# Patient Record
Sex: FEMALE | Race: OTHER | NOT HISPANIC OR LATINO | URBAN - METROPOLITAN AREA
[De-identification: names, ages, dates, MRNs, and addresses within clinical notes are randomized per-mention and may not be internally consistent; named-entity substitution may affect disease eponyms.]

---

## 2022-05-24 PROBLEM — Z00.00 ENCOUNTER FOR PREVENTIVE HEALTH EXAMINATION: Status: ACTIVE | Noted: 2022-05-24

## 2022-06-01 ENCOUNTER — OUTPATIENT (OUTPATIENT)
Dept: OUTPATIENT SERVICES | Facility: HOSPITAL | Age: 62
LOS: 1 days | End: 2022-06-01

## 2022-06-01 ENCOUNTER — APPOINTMENT (OUTPATIENT)
Dept: CT IMAGING | Facility: CLINIC | Age: 62
End: 2022-06-01
Payer: COMMERCIAL

## 2022-06-01 ENCOUNTER — RESULT REVIEW (OUTPATIENT)
Age: 62
End: 2022-06-01

## 2022-06-01 ENCOUNTER — APPOINTMENT (OUTPATIENT)
Dept: VASCULAR SURGERY | Facility: CLINIC | Age: 62
End: 2022-06-01
Payer: COMMERCIAL

## 2022-06-01 VITALS
SYSTOLIC BLOOD PRESSURE: 125 MMHG | BODY MASS INDEX: 28.17 KG/M2 | DIASTOLIC BLOOD PRESSURE: 84 MMHG | WEIGHT: 165 LBS | HEART RATE: 96 BPM | HEIGHT: 64 IN

## 2022-06-01 DIAGNOSIS — R10.9 UNSPECIFIED ABDOMINAL PAIN: ICD-10-CM

## 2022-06-01 DIAGNOSIS — Z01.818 ENCOUNTER FOR OTHER PREPROCEDURAL EXAMINATION: ICD-10-CM

## 2022-06-01 PROCEDURE — 99204 OFFICE O/P NEW MOD 45 MIN: CPT

## 2022-06-01 PROCEDURE — 74175 CTA ABDOMEN W/CONTRAST: CPT | Mod: 26

## 2022-06-01 PROCEDURE — 93979 VASCULAR STUDY: CPT

## 2022-06-02 PROBLEM — Z01.818 PREOP TESTING: Status: ACTIVE | Noted: 2022-06-02

## 2022-06-02 RX ORDER — CHOLECALCIFEROL (VITAMIN D3) 1250 MCG
1.25 MG CAPSULE ORAL
Qty: 4 | Refills: 0 | Status: ACTIVE | COMMUNITY
Start: 2021-12-30

## 2022-06-02 RX ORDER — CICLOPIROX 80 MG/ML
8 SOLUTION TOPICAL
Qty: 7 | Refills: 0 | Status: ACTIVE | COMMUNITY
Start: 2022-03-22

## 2022-06-02 RX ORDER — BENZONATATE 100 MG/1
100 CAPSULE ORAL
Qty: 20 | Refills: 0 | Status: ACTIVE | COMMUNITY
Start: 2022-01-06

## 2022-06-02 RX ORDER — PANTOPRAZOLE 40 MG/1
40 TABLET, DELAYED RELEASE ORAL
Qty: 30 | Refills: 0 | Status: ACTIVE | COMMUNITY
Start: 2022-05-09

## 2022-06-02 RX ORDER — ALBUTEROL SULFATE 90 UG/1
108 (90 BASE) INHALANT RESPIRATORY (INHALATION)
Qty: 8 | Refills: 0 | Status: ACTIVE | COMMUNITY
Start: 2022-01-06

## 2022-06-02 RX ORDER — PROMETHAZINE HYDROCHLORIDE AND DEXTROMETHORPHAN HYDROBROMIDE ORAL SOLUTION 15; 6.25 MG/5ML; MG/5ML
6.25-15 SOLUTION ORAL
Qty: 200 | Refills: 0 | Status: ACTIVE | COMMUNITY
Start: 2021-12-30

## 2022-06-03 NOTE — ASSESSMENT
[Arterial/Venous Disease] : arterial/venous disease [Aneurysm Surgery] : aneurysm surgery [FreeTextEntry1] : 60 y/o F w/ 1.9cm splenic artery aneurysm. On exam, radial pulses intact bilaterally. Abdominal exam benign. We reviewed outside records and completed Abd US which showed patent abdominal aorta. Aneurysmal dilation noted in the splenic artery at the hilum. Diameter measures 1.9 x 1.7 cm. Good perfusion in the spleen. Discussed with pt the abdominal symptoms are likely from the splenic artery aneurysm. It is also close to 2cm which is when we start considering surgical treatment. We will order a CT angiogram for further evaluation for possible repair in the near future. We also discussed endovascular embolization of the aneurysm. Risk, complications and alternatives were discussed, all questions were answered. We will discuss with the pt results of the scan and next steps to follow.

## 2022-06-03 NOTE — ASU PATIENT PROFILE, ADULT - NSICDXPASTMEDICALHX_GEN_ALL_CORE_FT
PAST MEDICAL HISTORY:  2019 novel coronavirus disease (COVID-19) Dec 2021    GERD (gastroesophageal reflux disease)

## 2022-06-03 NOTE — PROCEDURE
[FreeTextEntry1] : Abd US ordered to eval splenic artery aneurysm, shows: Patent abdominal aorta. Aneurysmal dilation noted in the splenic artery at the hilum. Diameter measures 1.9 x 1.7 cm. Good perfusion in the spleen.

## 2022-06-03 NOTE — ADDENDUM
[FreeTextEntry1] : I, Dr. Chico Gil, personally performed the evaluation and management (E/M) services for this new patient.  That E/M includes conducting the initial examination, assessing all conditions, and establishing the plan of care.  Today, my ACP, Lisa Razo NP, was here to observe my evaluation and management services for this patient to be followed going forward. \par \par The documentation for this encounter was entered by Marie Rosario acting as a scribe for Dr.Gary Gil.\par

## 2022-06-03 NOTE — ASU PATIENT PROFILE, ADULT - FALL HARM RISK - UNIVERSAL INTERVENTIONS
Bed in lowest position, wheels locked, appropriate side rails in place/Call bell, personal items and telephone in reach/Instruct patient to call for assistance before getting out of bed or chair/Non-slip footwear when patient is out of bed/[place] to call system/Physically safe environment - no spills, clutter or unnecessary equipment/Purposeful Proactive Rounding/Room/bathroom lighting operational, light cord in reach

## 2022-06-03 NOTE — CONSULT LETTER
[Dear  ___] : Dear  [unfilled], [FreeTextEntry2] : Mao Franklin MD\par [address]\[place] [address]\par \par Chrissy Carter MD\par [address]\[place] [address]\par \par Yahir Carmona MD\par [facility]\par [address]\[place] [address] [FreeTextEntry1] : I saw Ms Lizeth Mensah for evaluation of splenic artery aneurysm. She reports epigastric discomfort that has been present for a couple of months. The discomfort radiates across both upper quadrants and towards the back. She had an ultrasound which showed a splenic artery aneurysm in addition to cholelithiasis.  She denies post prandial pain, nausea or vomiting. No family history of aneurysms.\par \par On exam, abdomen is soft and nontender. No palpable masses. Blood pressure 125/84, heart rate 96 b/min.\par \par Abdominal ultrasound in the office is consistent with previous study which shows a 1.9 cm splenic artery aneurysm. \par \par Ms Mensah has a splenic aneurysm approaching 2cm.  Her symptomatology is consistent with a retroperitoneal process.  The symptoms are not consistent with cholelithiasis.  I am concerned that the symptoms may be related to the splenic artery aneurysm.  I obtained a CT angiogram today.  It confirms a 2 cm splenic artery aneurysm.  \par \par We are making plans to proceed with embolization of the splenic artery aneurysm.  Since she is possibly symptomatic were proceeding within the next several days.\par \par I will keep you posted regarding her status.    [FreeTextEntry3] : Sincerely, \par \par Chico Gil M.D. \par , Surgical Services [facility]\par , Department of Surgery [facility]\par Professor of Surgery, [employer] at [facility]

## 2022-06-03 NOTE — DATA REVIEWED
[FreeTextEntry1] : See outside medical records\par Abd US 5/19/22: splenic art aneurysm measuring [id number] near the splenic hilum

## 2022-06-03 NOTE — HISTORY OF PRESENT ILLNESS
[FreeTextEntry1] : 60 y/o F referred by Dr Franklin. She has a PMHx of GERD, IBS, hiatal hernia, gallstones, diverticulosis, and newly diagnosed splenic artery aneurysm. She reports intermittent epigastric abdominal pain over the last few months. The pain, discomfort and pressure-like feeling radiates to bilateral upper quadrants and towards the back. She also reports some abdominal bloating/distention. She had workup imaging completed and the splenic aneurysm was found. Received records prior to her visit which are available for us to review. These were all completed by her GI doctor and she adds they want to make sure the symptoms are not from the aneurysm vs gallstones. Denies any nausea, vomiting, diarrhea, constipation. She notes the discomfort also occurs mainly while she is sitting and is relieved when she is able to leans back. No family hx of aneurysms. She was on Omeprazole but stopped it a few days ago. \par \par \par SHx:\par -Works as an , WFH \par -Never smoker\par \par FHx:\par -Mother: passed age 90; hx of heart and kidney failure\par -Father: passed age 93; hx of colon ca/surgery and PNA

## 2022-06-03 NOTE — PHYSICAL EXAM
[Respiratory Effort] : normal respiratory effort [Normal Rate and Rhythm] : normal rate and rhythm [No Rash or Lesion] : No rash or lesion [Alert] : alert [Oriented to Person] : oriented to person [Oriented to Place] : oriented to place [Oriented to Time] : oriented to time [Calm] : calm [2+] : left 2+ [Carotid Bruits] : no carotid bruits [Right Carotid Bruit] : no bruit heard over the right carotid [Left Carotid Bruit] : no bruit heard over the left carotid [Ankle Swelling (On Exam)] : not present [Abdomen Masses] : No abdominal masses [Abdomen Tenderness] : ~T ~M No abdominal tenderness [de-identified] : WN/WD [de-identified] : NCAT [FreeTextEntry1] : Radial pulses intact bilaterally. [de-identified] : soft, nondistended, no tenderness.   [de-identified] : FROM

## 2022-06-05 ENCOUNTER — TRANSCRIPTION ENCOUNTER (OUTPATIENT)
Age: 62
End: 2022-06-05

## 2022-06-06 ENCOUNTER — APPOINTMENT (OUTPATIENT)
Dept: VASCULAR SURGERY | Facility: HOSPITAL | Age: 62
End: 2022-06-06

## 2022-06-06 ENCOUNTER — TRANSCRIPTION ENCOUNTER (OUTPATIENT)
Age: 62
End: 2022-06-06

## 2022-06-06 ENCOUNTER — INPATIENT (INPATIENT)
Facility: HOSPITAL | Age: 62
LOS: 0 days | Discharge: HOME | DRG: 272 | End: 2022-06-07
Attending: SURGERY | Admitting: SURGERY
Payer: COMMERCIAL

## 2022-06-06 VITALS
SYSTOLIC BLOOD PRESSURE: 130 MMHG | DIASTOLIC BLOOD PRESSURE: 70 MMHG | OXYGEN SATURATION: 100 % | HEART RATE: 79 BPM | RESPIRATION RATE: 16 BRPM | HEIGHT: 64 IN | WEIGHT: 169.32 LBS | TEMPERATURE: 98 F

## 2022-06-06 LAB — LACTATE SERPL-SCNC: 1.5 MMOL/L — SIGNIFICANT CHANGE UP (ref 0.5–2)

## 2022-06-06 PROCEDURE — 37242 VASC EMBOLIZE/OCCLUDE ARTERY: CPT | Mod: GC

## 2022-06-06 PROCEDURE — 83605 ASSAY OF LACTIC ACID: CPT

## 2022-06-06 PROCEDURE — C1894: CPT

## 2022-06-06 PROCEDURE — 36625 INSERTION CATHETER ARTERY: CPT | Mod: 59

## 2022-06-06 PROCEDURE — 76000 FLUOROSCOPY <1 HR PHYS/QHP: CPT

## 2022-06-06 PROCEDURE — C1769: CPT

## 2022-06-06 PROCEDURE — C1889: CPT

## 2022-06-06 PROCEDURE — C1887: CPT

## 2022-06-06 PROCEDURE — 36415 COLL VENOUS BLD VENIPUNCTURE: CPT

## 2022-06-06 DEVICE — SHEATH INTRODUCER TERUMO PINNACLE CORONARY 6FR X 10CM X 0.038" MINI WIRE: Type: IMPLANTABLE DEVICE | Site: LEFT | Status: FUNCTIONAL

## 2022-06-06 DEVICE — IMPLANTABLE DEVICE: Type: IMPLANTABLE DEVICE | Site: LEFT | Status: FUNCTIONAL

## 2022-06-06 DEVICE — CATH ANG BERENSTN 5FRX65CM: Type: IMPLANTABLE DEVICE | Site: LEFT | Status: FUNCTIONAL

## 2022-06-06 DEVICE — COIL POD J SOFT 60MM: Type: IMPLANTABLE DEVICE | Site: LEFT | Status: FUNCTIONAL

## 2022-06-06 DEVICE — GUIDEWIRE GLIDEWIRE ANGLED TIP 0.035" X 180CM STIFF: Type: IMPLANTABLE DEVICE | Site: LEFT | Status: FUNCTIONAL

## 2022-06-06 DEVICE — GWIRE SUPRACORE .035X370: Type: IMPLANTABLE DEVICE | Site: LEFT | Status: FUNCTIONAL

## 2022-06-06 DEVICE — SURGICEL FIBRILLAR 4 X 4": Type: IMPLANTABLE DEVICE | Site: LEFT | Status: FUNCTIONAL

## 2022-06-06 DEVICE — GUIDEWIRE RADIFOCUS GLIDEWIRE STANDARD ANGLED TIP 0.035" X 260CM: Type: IMPLANTABLE DEVICE | Site: LEFT | Status: FUNCTIONAL

## 2022-06-06 DEVICE — SHEATH INTRODUCER TERUMO PINNACLE CORONARY 5FR X 10CM X 0.038" MINI WIRE: Type: IMPLANTABLE DEVICE | Site: LEFT | Status: FUNCTIONAL

## 2022-06-06 DEVICE — CATH SIZING MARK PIGTL 5FR 100CM: Type: IMPLANTABLE DEVICE | Site: LEFT | Status: FUNCTIONAL

## 2022-06-06 DEVICE — CATH SOFTVU BERENSTN 5FRX100: Type: IMPLANTABLE DEVICE | Site: LEFT | Status: FUNCTIONAL

## 2022-06-06 DEVICE — GWIRE BENT 0.035INX180CM TFE: Type: IMPLANTABLE DEVICE | Site: LEFT | Status: FUNCTIONAL

## 2022-06-06 DEVICE — GWIRE ANGL .035X180 STD: Type: IMPLANTABLE DEVICE | Site: LEFT | Status: FUNCTIONAL

## 2022-06-06 RX ORDER — ONDANSETRON 8 MG/1
4 TABLET, FILM COATED ORAL ONCE
Refills: 0 | Status: COMPLETED | OUTPATIENT
Start: 2022-06-06 | End: 2022-06-06

## 2022-06-06 RX ORDER — SODIUM CHLORIDE 9 MG/ML
1000 INJECTION INTRAMUSCULAR; INTRAVENOUS; SUBCUTANEOUS
Refills: 0 | Status: DISCONTINUED | OUTPATIENT
Start: 2022-06-06 | End: 2022-06-07

## 2022-06-06 RX ORDER — DEXAMETHASONE 0.5 MG/5ML
4 ELIXIR ORAL ONCE
Refills: 0 | Status: COMPLETED | OUTPATIENT
Start: 2022-06-06 | End: 2022-06-06

## 2022-06-06 RX ORDER — METOCLOPRAMIDE HCL 10 MG
10 TABLET ORAL ONCE
Refills: 0 | Status: DISCONTINUED | OUTPATIENT
Start: 2022-06-06 | End: 2022-06-06

## 2022-06-06 RX ORDER — SODIUM CHLORIDE 9 MG/ML
1000 INJECTION INTRAMUSCULAR; INTRAVENOUS; SUBCUTANEOUS
Refills: 0 | Status: DISCONTINUED | OUTPATIENT
Start: 2022-06-06 | End: 2022-06-06

## 2022-06-06 RX ORDER — METOCLOPRAMIDE HCL 10 MG
10 TABLET ORAL ONCE
Refills: 0 | Status: COMPLETED | OUTPATIENT
Start: 2022-06-06 | End: 2022-06-06

## 2022-06-06 RX ORDER — FAMOTIDINE 10 MG/ML
20 INJECTION INTRAVENOUS ONCE
Refills: 0 | Status: COMPLETED | OUTPATIENT
Start: 2022-06-06 | End: 2022-06-06

## 2022-06-06 RX ADMIN — ONDANSETRON 4 MILLIGRAM(S): 8 TABLET, FILM COATED ORAL at 16:17

## 2022-06-06 RX ADMIN — Medication 10 MILLIGRAM(S): at 18:16

## 2022-06-06 RX ADMIN — SODIUM CHLORIDE 100 MILLILITER(S): 9 INJECTION INTRAMUSCULAR; INTRAVENOUS; SUBCUTANEOUS at 22:55

## 2022-06-06 RX ADMIN — Medication 10 MILLIGRAM(S): at 20:08

## 2022-06-06 RX ADMIN — SODIUM CHLORIDE 70 MILLILITER(S): 9 INJECTION INTRAMUSCULAR; INTRAVENOUS; SUBCUTANEOUS at 15:52

## 2022-06-06 RX ADMIN — Medication 4 MILLIGRAM(S): at 16:50

## 2022-06-06 RX ADMIN — FAMOTIDINE 20 MILLIGRAM(S): 10 INJECTION INTRAVENOUS at 16:17

## 2022-06-06 NOTE — ASU DISCHARGE PLAN (ADULT/PEDIATRIC) - CARE PROVIDER_API CALL
Chico Gil)  Vascular Surgery  [address]  [place], [place] [address]  Phone: [phone]  Fax: [phone]  Follow Up Time: 2 weeks

## 2022-06-06 NOTE — BRIEF OPERATIVE NOTE - OPERATION/FINDINGS
Procedure: Splenic Artery Aneurysm Embolization  Indication: Splenic artery aneurysm  Description: Left brachial artery cutdown, splenic artery embolization with [id number] outflow; 2 x [id number] framing coil, 5x 60cm packing coils, and 5x30mm and 4x15mm coil in the inflow.  IVF: 1L  EBL: 50cc  Pulses: palpable radial

## 2022-06-07 VITALS
HEART RATE: 94 BPM | DIASTOLIC BLOOD PRESSURE: 67 MMHG | OXYGEN SATURATION: 98 % | SYSTOLIC BLOOD PRESSURE: 109 MMHG | RESPIRATION RATE: 20 BRPM

## 2022-06-07 NOTE — PACU DISCHARGE NOTE - COMMENTS
Patient is hemodynamically stable and reports no c/o pain. Meets PACU discharge criteria. Discharge instructions given to patient who stated full understanding and denied having any questions. Patient discharged home in care of her . (4) excellent

## 2022-06-10 ENCOUNTER — APPOINTMENT (OUTPATIENT)
Dept: VASCULAR SURGERY | Facility: CLINIC | Age: 62
End: 2022-06-10

## 2022-06-10 VITALS
WEIGHT: 165 LBS | HEART RATE: 93 BPM | DIASTOLIC BLOOD PRESSURE: 79 MMHG | SYSTOLIC BLOOD PRESSURE: 128 MMHG | BODY MASS INDEX: 28.17 KG/M2 | HEIGHT: 64 IN

## 2022-06-10 DIAGNOSIS — K21.9 GASTRO-ESOPHAGEAL REFLUX DISEASE WITHOUT ESOPHAGITIS: ICD-10-CM

## 2022-06-10 DIAGNOSIS — I72.8 ANEURYSM OF OTHER SPECIFIED ARTERIES: ICD-10-CM

## 2022-06-10 DIAGNOSIS — K58.9 IRRITABLE BOWEL SYNDROME WITHOUT DIARRHEA: ICD-10-CM

## 2022-06-10 DIAGNOSIS — K44.9 DIAPHRAGMATIC HERNIA WITHOUT OBSTRUCTION OR GANGRENE: ICD-10-CM

## 2022-06-10 DIAGNOSIS — K80.20 CALCULUS OF GALLBLADDER WITHOUT CHOLECYSTITIS WITHOUT OBSTRUCTION: ICD-10-CM

## 2022-06-10 PROCEDURE — 93979 VASCULAR STUDY: CPT

## 2022-06-10 PROCEDURE — 99213 OFFICE O/P EST LOW 20 MIN: CPT

## 2022-06-10 NOTE — DISCUSSION/SUMMARY
[FreeTextEntry1] : 60 y/o F w/ >2cm splenic artery aneurysm now s/p embolization on 6/6/22. On exam, radial pulses intact bilaterally. Left arm access site with very mild swelling and ecchymosis. No signs of infection. Abd US today shows coils in splenic aneurysm site, and perfusion to spleen. She should increase her PO intake, stay active, and if the constipation persist, she is advised to take miralax and/or discuss to with his GI doctor. Stay hydrated. Pt recommended to see us again in one year.

## 2022-06-10 NOTE — ADDENDUM
[FreeTextEntry1] : I, Dr. Chico Gil, personally performed the evaluation and management (E/M) services for this established patient who presents today with (a) new problem(s)/exacerbation of (an) existing condition(s).  That E/M includes conducting the examination, assessing all new/exacerbated conditions, and establishing a new plan of care.  Today, my ACP, Lisa Razo NP, was here to observe my evaluation and management services for this new problem/exacerbated condition to be followed going forward.

## 2022-06-10 NOTE — REASON FOR VISIT
[de-identified] : Splenic artery aneurysm embolization [de-identified] : 6/6/22 [de-identified] : 62 y/o F originally referred by Dr Franklin. She has a PMHx of GERD, IBS, hiatal hernia, gallstones, diverticulosis, and newly diagnosed splenic artery aneurysm >2cm. This was found given intermittent GI symptoms. She is now s/p splenic artery aneurysm embolization on 6/6/22. She reports feeling well however, she had some epigastric discomfort for 2 days after the surgery but it has resolved. She had some nausea postoperatively which resolved as well and had only one episode of vomiting POD0. The pain she was feeling, wrapping around the stomach and back, like a band, has now disappeared. She has been increasing her food intake. Denies any more nausea, vomiting, fever, chills, arm access site drainage or pain. She has not had a bowel movement which is unusual for her given her hx of IBS however, she does not feel uncomfortable and feels after her nerves regarding today's visit, it will improve. \par \par Her  is downstairs waiting for her. \par \par SHx:\par -Works as an , [employer] \par -Never smoker\par \par FHx:\par -Mother: passed age 90; hx of heart and kidney failure\par -Father: passed age 93; hx of colon ca/surgery and PNA

## 2022-10-26 NOTE — PHYSICAL EXAM
This was a shared visit with the TERESITA. I reviewed and verified the documentation and independently performed the documented: [Respiratory Effort] : normal respiratory effort [Normal Rate and Rhythm] : normal rate and rhythm [2+] : left 2+ [No Rash or Lesion] : No rash or lesion [Alert] : alert [Oriented to Person] : oriented to person [Oriented to Place] : oriented to place [Oriented to Time] : oriented to time [Calm] : calm [Carotid Bruits] : no carotid bruits [Right Carotid Bruit] : no bruit heard over the right carotid [Left Carotid Bruit] : no bruit heard over the left carotid [Ankle Swelling (On Exam)] : not present [Abdomen Masses] : No abdominal masses [Abdomen Tenderness] : ~T ~M No abdominal tenderness [de-identified] : WN/WD [de-identified] : NCAT [FreeTextEntry1] : Radial pulses intact bilaterally. Left arm access site clean, well approximated, dry intact with mild swelling and faint ecchymosis. [de-identified] : soft, nondistended, no tenderness.   [de-identified] : FROM

## 2024-03-12 ENCOUNTER — APPOINTMENT (OUTPATIENT)
Dept: VASCULAR SURGERY | Facility: CLINIC | Age: 64
End: 2024-03-12
Payer: COMMERCIAL

## 2024-03-12 VITALS
BODY MASS INDEX: 28.17 KG/M2 | HEART RATE: 99 BPM | DIASTOLIC BLOOD PRESSURE: 74 MMHG | WEIGHT: 165 LBS | SYSTOLIC BLOOD PRESSURE: 127 MMHG | HEIGHT: 64 IN

## 2024-03-12 DIAGNOSIS — I72.8 ANEURYSM OF OTHER SPECIFIED ARTERIES: ICD-10-CM

## 2024-03-12 PROCEDURE — 99213 OFFICE O/P EST LOW 20 MIN: CPT

## 2024-03-12 PROCEDURE — 93976 VASCULAR STUDY: CPT

## 2024-03-12 NOTE — HISTORY OF PRESENT ILLNESS
[FreeTextEntry1] : 64 y/o F originally referred by Dr Franklin. She has a PMHx of GERD, IBS, hiatal hernia, gallstones, diverticulosis, and newly diagnosed splenic artery aneurysm >2cm. This was found given intermittent GI symptoms. She is now s/p splenic artery aneurysm embolization on 6/6/22. She reports feeling well. Denies any nausea, vomiting, fever, chills, or abdominal pain.   Her  is downstairs waiting for her. Recently diagnosed with prostate low-grade cancer, monitoring for now.   SHx: -Works as an , [employer] -Never smoker  FHx: -Mother: passed age 90; hx of heart and kidney failure -Father: passed age 93; hx of colon ca/surgery and PNA.

## 2024-03-12 NOTE — PROCEDURE
[FreeTextEntry1] : Abd US ordered to eval splenic artery aneurysm shows: coils in splenic aneurysm site, and good perfusion to spleen.

## 2024-03-12 NOTE — PHYSICAL EXAM
[Respiratory Effort] : normal respiratory effort [Abdomen Tenderness] : ~T ~M No abdominal tenderness [No Rash or Lesion] : No rash or lesion [Alert] : alert [Oriented to Person] : oriented to person [Oriented to Place] : oriented to place [Oriented to Time] : oriented to time [Calm] : calm [de-identified] : WN/WD [de-identified] : FROM [de-identified] : soft, nondistended

## 2024-03-12 NOTE — ASSESSMENT
[FreeTextEntry1] : 62 y/o F w/ >2cm splenic artery aneurysm now s/p embolization on 6/6/22. Abd exam benign. Abd US today shows coils in splenic aneurysm site, and good perfusion to spleen.  Stay hydrated. Pt recommended to see us again in one year.   [Arterial/Venous Disease] : arterial/venous disease

## 2024-05-30 NOTE — PRE-OP CHECKLIST - BMI (KG/M2)
Patient states she is needing to speak with the nurse about her medication, and would not give any additional information    Please call patient       29.1

## (undated) DEVICE — SUT VICRYL 2-0 27" CT-1 UNDYED

## (undated) DEVICE — HANDLE DETACHMENT FOR COILS

## (undated) DEVICE — TORQUE DEVICE FOR GUIDEWIRE 0.0100.038"

## (undated) DEVICE — GLV 7.5 PROTEXIS (WHITE)

## (undated) DEVICE — MARKING PEN W RULER

## (undated) DEVICE — PACK ANGIOGRAM LNX SURGICOUNT

## (undated) DEVICE — WARMING BLANKET UPPER ADULT